# Patient Record
Sex: FEMALE | Race: BLACK OR AFRICAN AMERICAN | NOT HISPANIC OR LATINO | URBAN - METROPOLITAN AREA
[De-identification: names, ages, dates, MRNs, and addresses within clinical notes are randomized per-mention and may not be internally consistent; named-entity substitution may affect disease eponyms.]

---

## 2022-06-28 ENCOUNTER — OFFICE VISIT (OUTPATIENT)
Dept: URBAN - METROPOLITAN AREA CLINIC 44 | Facility: CLINIC | Age: 27
End: 2022-06-28
Payer: COMMERCIAL

## 2022-06-28 ENCOUNTER — LAB OUTSIDE AN ENCOUNTER (OUTPATIENT)
Dept: URBAN - METROPOLITAN AREA CLINIC 44 | Facility: CLINIC | Age: 27
End: 2022-06-28

## 2022-06-28 VITALS
HEIGHT: 61 IN | DIASTOLIC BLOOD PRESSURE: 85 MMHG | TEMPERATURE: 98.1 F | BODY MASS INDEX: 43.58 KG/M2 | SYSTOLIC BLOOD PRESSURE: 144 MMHG | HEART RATE: 76 BPM | WEIGHT: 230.8 LBS

## 2022-06-28 DIAGNOSIS — R10.13 DYSPEPSIA: ICD-10-CM

## 2022-06-28 DIAGNOSIS — R10.9 RIGHT SIDED ABDOMINAL PAIN: ICD-10-CM

## 2022-06-28 DIAGNOSIS — R19.7 WATERY DIARRHEA: ICD-10-CM

## 2022-06-28 PROCEDURE — 99204 OFFICE O/P NEW MOD 45 MIN: CPT | Performed by: INTERNAL MEDICINE

## 2022-06-28 RX ORDER — IBUPROFEN 600 MG/1
1 TABLET WITH FOOD OR MILK AS NEEDED TABLET, FILM COATED ORAL THREE TIMES A DAY
Status: ACTIVE | COMMUNITY

## 2022-06-28 RX ORDER — NAPROXEN 375 MG/1
1 TABLET WITH FOOD OR MILK AS NEEDED TABLET ORAL
Status: ACTIVE | COMMUNITY

## 2022-06-28 RX ORDER — DICYCLOMINE HYDROCHLORIDE 20 MG/1
1 TABLET TABLET ORAL THREE TIMES A DAY
Status: ACTIVE | COMMUNITY

## 2022-06-28 RX ORDER — HYDROCODONE BITARTRATE AND ACETAMINOPHEN 5; 325 MG/1; MG/1
1 TABLET AS NEEDED TABLET ORAL
Status: ACTIVE | COMMUNITY

## 2022-06-28 RX ORDER — CEPHALEXIN 500 MG/1
1 CAPSULE CAPSULE ORAL
Status: ACTIVE | COMMUNITY

## 2022-06-28 RX ORDER — METOPROLOL TARTRATE 100 MG/1
1 TABLET WITH FOOD TABLET, FILM COATED ORAL TWICE A DAY
Status: ACTIVE | COMMUNITY

## 2022-06-28 RX ORDER — ONDANSETRON 4 MG/1
1 TABLET ON THE TONGUE AND ALLOW TO DISSOLVE TABLET, ORALLY DISINTEGRATING ORAL ONCE A DAY
Status: ACTIVE | COMMUNITY

## 2022-06-28 NOTE — HPI-TODAY'S VISIT:
26 yo F presents for an OV to discuss dyspepsia, lower abdominal pain and diarrhea.   1. Dyspepsia. Onset began 1 month ago as she mentions nausea, early satiety and bloating. Denies change in diet and no new medications. However, admits to NSAID use fo period cramps.  2. Lower abdominal pain. Onset 1 month ago. Describes as sharp and rates a 10/10 at its worse. Describes as starting in the LLQ and radiation to the RLQ and R flank. No precipitating factors, no alleviating factors.  3. Diarrhea. Watery and [place] 6 x several per day. Denies hematochezia, out of the country travel, or sick contacts(no hospital, nursing home visits). Abdominal pain not related to bowel habits.   Prior to one month ago the patient had no GI symptoms. Her symptoms prompted two ER visits this month. I reviewed the pelvic US, Xray and CT scan of the abdomen which were all unrevealing. The CT scan revealed a possible distal descending epiploic appendagitis. Denies weight loss.

## 2022-07-01 ENCOUNTER — OFFICE VISIT (OUTPATIENT)
Dept: URBAN - METROPOLITAN AREA SURGERY CENTER 27 | Facility: SURGERY CENTER | Age: 27
End: 2022-07-01
Payer: COMMERCIAL

## 2022-07-01 ENCOUNTER — TELEPHONE ENCOUNTER (OUTPATIENT)
Dept: URBAN - METROPOLITAN AREA CLINIC 92 | Facility: CLINIC | Age: 27
End: 2022-07-01

## 2022-07-01 ENCOUNTER — LAB OUTSIDE AN ENCOUNTER (OUTPATIENT)
Dept: URBAN - METROPOLITAN AREA CLINIC 92 | Facility: CLINIC | Age: 27
End: 2022-07-01

## 2022-07-01 ENCOUNTER — CLAIMS CREATED FROM THE CLAIM WINDOW (OUTPATIENT)
Dept: URBAN - METROPOLITAN AREA CLINIC 4 | Facility: CLINIC | Age: 27
End: 2022-07-01
Payer: COMMERCIAL

## 2022-07-01 DIAGNOSIS — R10.13 ABDOMINAL DISCOMFORT, EPIGASTRIC: ICD-10-CM

## 2022-07-01 DIAGNOSIS — K21.00 ALKALINE REFLUX ESOPHAGITIS: ICD-10-CM

## 2022-07-01 DIAGNOSIS — K31.89 OTHER DISEASES OF STOMACH AND DUODENUM: ICD-10-CM

## 2022-07-01 DIAGNOSIS — K31.89 ACQUIRED DEFORMITY OF DUODENUM: ICD-10-CM

## 2022-07-01 DIAGNOSIS — R10.84 ABDOMINAL CRAMPING, GENERALIZED: ICD-10-CM

## 2022-07-01 PROCEDURE — G8907 PT DOC NO EVENTS ON DISCHARG: HCPCS | Performed by: INTERNAL MEDICINE

## 2022-07-01 PROCEDURE — 88305 TISSUE EXAM BY PATHOLOGIST: CPT | Performed by: PATHOLOGY

## 2022-07-01 PROCEDURE — 88312 SPECIAL STAINS GROUP 1: CPT | Performed by: PATHOLOGY

## 2022-07-01 PROCEDURE — 43239 EGD BIOPSY SINGLE/MULTIPLE: CPT | Performed by: INTERNAL MEDICINE

## 2022-07-01 RX ORDER — HYDROCODONE BITARTRATE AND ACETAMINOPHEN 5; 325 MG/1; MG/1
1 TABLET AS NEEDED TABLET ORAL
Status: ACTIVE | COMMUNITY

## 2022-07-01 RX ORDER — METOPROLOL TARTRATE 100 MG/1
1 TABLET WITH FOOD TABLET, FILM COATED ORAL TWICE A DAY
Status: ACTIVE | COMMUNITY

## 2022-07-01 RX ORDER — DICYCLOMINE HYDROCHLORIDE 20 MG/1
1 TABLET TABLET ORAL THREE TIMES A DAY
Status: ACTIVE | COMMUNITY

## 2022-07-01 RX ORDER — IBUPROFEN 600 MG/1
1 TABLET WITH FOOD OR MILK AS NEEDED TABLET, FILM COATED ORAL THREE TIMES A DAY
Status: ACTIVE | COMMUNITY

## 2022-07-01 RX ORDER — CEPHALEXIN 500 MG/1
1 CAPSULE CAPSULE ORAL
Status: ACTIVE | COMMUNITY

## 2022-07-01 RX ORDER — ONDANSETRON 4 MG/1
1 TABLET ON THE TONGUE AND ALLOW TO DISSOLVE TABLET, ORALLY DISINTEGRATING ORAL ONCE A DAY
Status: ACTIVE | COMMUNITY

## 2022-07-01 RX ORDER — NAPROXEN 375 MG/1
1 TABLET WITH FOOD OR MILK AS NEEDED TABLET ORAL
Status: ACTIVE | COMMUNITY

## 2022-07-01 RX ORDER — PANTOPRAZOLE SODIUM 40 MG/1
1 TABLET TABLET, DELAYED RELEASE ORAL ONCE A DAY
Qty: 30 | OUTPATIENT
Start: 2022-07-01

## 2022-07-05 ENCOUNTER — TELEPHONE ENCOUNTER (OUTPATIENT)
Dept: URBAN - METROPOLITAN AREA CLINIC 46 | Facility: CLINIC | Age: 27
End: 2022-07-05

## 2022-07-05 RX ORDER — IBUPROFEN 600 MG/1
1 TABLET WITH FOOD OR MILK AS NEEDED TABLET, FILM COATED ORAL THREE TIMES A DAY
Status: ACTIVE | COMMUNITY

## 2022-07-05 RX ORDER — CEPHALEXIN 500 MG/1
1 CAPSULE CAPSULE ORAL
Status: ACTIVE | COMMUNITY

## 2022-07-05 RX ORDER — DICYCLOMINE HYDROCHLORIDE 20 MG/1
1 TABLET TABLET ORAL THREE TIMES A DAY
Status: ACTIVE | COMMUNITY

## 2022-07-05 RX ORDER — VANCOMYCIN HYDROCHLORIDE 125 MG/1
1 CAPSULE CAPSULE ORAL
Qty: 40 CAPSULE | Refills: 0 | OUTPATIENT
Start: 2022-07-05 | End: 2022-07-15

## 2022-07-05 RX ORDER — ONDANSETRON 4 MG/1
1 TABLET ON THE TONGUE AND ALLOW TO DISSOLVE TABLET, ORALLY DISINTEGRATING ORAL ONCE A DAY
Status: ACTIVE | COMMUNITY

## 2022-07-05 RX ORDER — NAPROXEN 375 MG/1
1 TABLET WITH FOOD OR MILK AS NEEDED TABLET ORAL
Status: ACTIVE | COMMUNITY

## 2022-07-05 RX ORDER — METOPROLOL TARTRATE 100 MG/1
1 TABLET WITH FOOD TABLET, FILM COATED ORAL TWICE A DAY
Status: ACTIVE | COMMUNITY

## 2022-07-05 RX ORDER — PANTOPRAZOLE SODIUM 40 MG/1
1 TABLET TABLET, DELAYED RELEASE ORAL ONCE A DAY
Qty: 30 | Status: ACTIVE | COMMUNITY
Start: 2022-07-01

## 2022-07-05 RX ORDER — HYDROCODONE BITARTRATE AND ACETAMINOPHEN 5; 325 MG/1; MG/1
1 TABLET AS NEEDED TABLET ORAL
Status: ACTIVE | COMMUNITY

## 2022-07-07 ENCOUNTER — TELEPHONE ENCOUNTER (OUTPATIENT)
Dept: URBAN - METROPOLITAN AREA CLINIC 46 | Facility: CLINIC | Age: 27
End: 2022-07-07

## 2022-07-07 RX ORDER — ONDANSETRON 4 MG/1
1 TABLET ON THE TONGUE AND ALLOW TO DISSOLVE TABLET, ORALLY DISINTEGRATING ORAL ONCE A DAY
Status: ACTIVE | COMMUNITY

## 2022-07-07 RX ORDER — VANCOMYCIN HYDROCHLORIDE 125 MG/1
1 CAPSULE CAPSULE ORAL
Qty: 40 CAPSULE | Refills: 0 | Status: ACTIVE | COMMUNITY
Start: 2022-07-05 | End: 2022-07-15

## 2022-07-07 RX ORDER — HYDROCODONE BITARTRATE AND ACETAMINOPHEN 5; 325 MG/1; MG/1
1 TABLET AS NEEDED TABLET ORAL
Status: ACTIVE | COMMUNITY

## 2022-07-07 RX ORDER — METRONIDAZOLE 500 MG/1
1 TABLET TABLET ORAL THREE TIMES A DAY
Qty: 30 | OUTPATIENT
Start: 2022-07-18 | End: 2022-07-28

## 2022-07-07 RX ORDER — NAPROXEN 375 MG/1
1 TABLET WITH FOOD OR MILK AS NEEDED TABLET ORAL
Status: ACTIVE | COMMUNITY

## 2022-07-07 RX ORDER — CEPHALEXIN 500 MG/1
1 CAPSULE CAPSULE ORAL
Status: ACTIVE | COMMUNITY

## 2022-07-07 RX ORDER — PANTOPRAZOLE SODIUM 40 MG/1
1 TABLET TABLET, DELAYED RELEASE ORAL ONCE A DAY
Qty: 30 | Status: ACTIVE | COMMUNITY
Start: 2022-07-01

## 2022-07-07 RX ORDER — METOPROLOL TARTRATE 100 MG/1
1 TABLET WITH FOOD TABLET, FILM COATED ORAL TWICE A DAY
Status: ACTIVE | COMMUNITY

## 2022-07-07 RX ORDER — DICYCLOMINE HYDROCHLORIDE 20 MG/1
1 TABLET TABLET ORAL THREE TIMES A DAY
Status: ACTIVE | COMMUNITY

## 2022-07-07 RX ORDER — IBUPROFEN 600 MG/1
1 TABLET WITH FOOD OR MILK AS NEEDED TABLET, FILM COATED ORAL THREE TIMES A DAY
Status: ACTIVE | COMMUNITY

## 2022-07-13 PROBLEM — 5933001 CLOSTRIDIUM DIFFICILE: Status: ACTIVE | Noted: 2022-07-13

## 2022-08-02 ENCOUNTER — LAB OUTSIDE AN ENCOUNTER (OUTPATIENT)
Dept: URBAN - METROPOLITAN AREA CLINIC 44 | Facility: CLINIC | Age: 27
End: 2022-08-02

## 2022-08-02 ENCOUNTER — OFFICE VISIT (OUTPATIENT)
Dept: URBAN - METROPOLITAN AREA CLINIC 44 | Facility: CLINIC | Age: 27
End: 2022-08-02
Payer: COMMERCIAL

## 2022-08-02 VITALS
DIASTOLIC BLOOD PRESSURE: 82 MMHG | BODY MASS INDEX: 43.01 KG/M2 | WEIGHT: 227.8 LBS | HEART RATE: 71 BPM | TEMPERATURE: 98.2 F | HEIGHT: 61 IN | SYSTOLIC BLOOD PRESSURE: 129 MMHG

## 2022-08-02 DIAGNOSIS — R10.13 DYSPEPSIA: ICD-10-CM

## 2022-08-02 DIAGNOSIS — A04.72 C. DIFFICILE DIARRHEA: ICD-10-CM

## 2022-08-02 PROCEDURE — 99214 OFFICE O/P EST MOD 30 MIN: CPT | Performed by: INTERNAL MEDICINE

## 2022-08-02 RX ORDER — ETONOGESTREL 68 MG/1
AS DIRECTED IMPLANT SUBCUTANEOUS
Status: ACTIVE | COMMUNITY

## 2022-08-02 RX ORDER — METOPROLOL TARTRATE 100 MG/1
1 TABLET WITH FOOD TABLET, FILM COATED ORAL TWICE A DAY
Status: ACTIVE | COMMUNITY

## 2022-08-02 RX ORDER — PROMETHAZINE HYDROCHLORIDE 25 MG/1
1 TABLET AS NEEDED TABLET ORAL
Qty: 60 | OUTPATIENT
Start: 2022-08-02 | End: 2022-09-01

## 2022-08-02 NOTE — HPI-TODAY'S VISIT:
28 yo F presents for an OV to discuss dyspepsia, lower abdominal pain and diarrhea.   1. Dyspepsia.  Onset began a few months ago and she mentions nausea, early satiety and bloating. Denies change in diet and no new medications. However, admits to NSAID use fo period cramps. However, after her last EGD on 7/22(grade d esophagitis) she discontinued thes medications. Her stomach feels minimally better. She still endorses nausea and intermittent cramping.  2. Lower abdominal pain. Moderately resolved. Intermittent RLQ pain.    3. Diarrhea. Improved but she now has 2 BMs that are toothpaste consistency(previously watery and [place] 6 x several per day). Denies hematochezia, out of the country travel, or sick contacts(no hospital, nursing home visits). Abdominal pain not related to bowel habits.   Brief history: Prior to one month ago the patient had no GI symptoms. Her symptoms prompted two ER visits this month. I reviewed the pelvic US, Xray and CT scan of the abdomen which were all unrevealing. The CT scan revealed a possible distal descending epiploic appendagitis. Denies weight loss.

## 2022-08-19 ENCOUNTER — OFFICE VISIT (OUTPATIENT)
Dept: URBAN - METROPOLITAN AREA SURGERY CENTER 27 | Facility: SURGERY CENTER | Age: 27
End: 2022-08-19
Payer: COMMERCIAL

## 2022-08-19 ENCOUNTER — TELEPHONE ENCOUNTER (OUTPATIENT)
Dept: URBAN - METROPOLITAN AREA CLINIC 44 | Facility: CLINIC | Age: 27
End: 2022-08-19

## 2022-08-19 ENCOUNTER — CLAIMS CREATED FROM THE CLAIM WINDOW (OUTPATIENT)
Dept: URBAN - METROPOLITAN AREA CLINIC 4 | Facility: CLINIC | Age: 27
End: 2022-08-19
Payer: COMMERCIAL

## 2022-08-19 DIAGNOSIS — R10.13 ABDOMINAL DISCOMFORT, EPIGASTRIC: ICD-10-CM

## 2022-08-19 DIAGNOSIS — K29.70 GASTRITIS, UNSPECIFIED, WITHOUT BLEEDING: ICD-10-CM

## 2022-08-19 DIAGNOSIS — K44.9 DIAPHRAGMATIC HERNIA: ICD-10-CM

## 2022-08-19 DIAGNOSIS — K29.70 CHRONIC ACITVE GASTRITIS (H.PYLORI NEGATIVE): ICD-10-CM

## 2022-08-19 PROCEDURE — 88312 SPECIAL STAINS GROUP 1: CPT | Performed by: PATHOLOGY

## 2022-08-19 PROCEDURE — 88305 TISSUE EXAM BY PATHOLOGIST: CPT | Performed by: PATHOLOGY

## 2022-08-19 PROCEDURE — 43239 EGD BIOPSY SINGLE/MULTIPLE: CPT | Performed by: INTERNAL MEDICINE

## 2022-08-19 PROCEDURE — G8907 PT DOC NO EVENTS ON DISCHARG: HCPCS | Performed by: INTERNAL MEDICINE

## 2022-08-19 RX ORDER — ETONOGESTREL 68 MG/1
AS DIRECTED IMPLANT SUBCUTANEOUS
Status: ACTIVE | COMMUNITY

## 2022-08-19 RX ORDER — PROMETHAZINE HYDROCHLORIDE 25 MG/1
1 TABLET AS NEEDED TABLET ORAL
Qty: 60 | Status: ACTIVE | COMMUNITY
Start: 2022-08-02 | End: 2022-09-01

## 2022-08-19 RX ORDER — METOPROLOL TARTRATE 100 MG/1
1 TABLET WITH FOOD TABLET, FILM COATED ORAL TWICE A DAY
Status: ACTIVE | COMMUNITY

## 2022-08-19 RX ORDER — PANTOPRAZOLE SODIUM 40 MG/1
1 TABLET TABLET, DELAYED RELEASE ORAL ONCE A DAY
Qty: 60 TABLET | Refills: 2 | OUTPATIENT
Start: 2022-08-19

## 2023-02-23 ENCOUNTER — WEB ENCOUNTER (OUTPATIENT)
Dept: URBAN - METROPOLITAN AREA CLINIC 44 | Facility: CLINIC | Age: 28
End: 2023-02-23

## 2023-02-23 RX ORDER — PANTOPRAZOLE SODIUM 40 MG/1
1 TABLET TABLET, DELAYED RELEASE ORAL ONCE A DAY
Qty: 60 TABLET | Refills: 2
Start: 2022-08-19

## 2023-02-28 ENCOUNTER — ERX REFILL RESPONSE (OUTPATIENT)
Dept: URBAN - METROPOLITAN AREA CLINIC 44 | Facility: CLINIC | Age: 28
End: 2023-02-28

## 2023-02-28 RX ORDER — PANTOPRAZOLE SODIUM 40 MG/1
1 TABLET TABLET, DELAYED RELEASE ORAL ONCE A DAY
Qty: 60 TABLET | Refills: 2 | OUTPATIENT

## 2023-02-28 RX ORDER — PANTOPRAZOLE SODIUM 40 MG/1
TAKE 1 TABLET BY MOUTH EVERY DAY FOR 60 DAYS TABLET, DELAYED RELEASE ORAL
Qty: 30 TABLET | Refills: 5 | OUTPATIENT

## 2023-08-18 ENCOUNTER — WEB ENCOUNTER (OUTPATIENT)
Dept: URBAN - METROPOLITAN AREA CLINIC 44 | Facility: CLINIC | Age: 28
End: 2023-08-18

## 2023-08-18 RX ORDER — PANTOPRAZOLE SODIUM 40 MG/1
1 TABLET TABLET, DELAYED RELEASE ORAL ONCE A DAY
Qty: 90 TABLET | Refills: 3

## 2023-10-30 ENCOUNTER — OFFICE VISIT (OUTPATIENT)
Dept: URBAN - METROPOLITAN AREA CLINIC 48 | Facility: CLINIC | Age: 28
End: 2023-10-30
Payer: COMMERCIAL

## 2023-10-30 ENCOUNTER — TELEPHONE ENCOUNTER (OUTPATIENT)
Dept: URBAN - METROPOLITAN AREA CLINIC 48 | Facility: CLINIC | Age: 28
End: 2023-10-30

## 2023-10-30 VITALS
DIASTOLIC BLOOD PRESSURE: 73 MMHG | HEIGHT: 61 IN | BODY MASS INDEX: 41.31 KG/M2 | WEIGHT: 218.8 LBS | TEMPERATURE: 98.1 F | HEART RATE: 71 BPM | SYSTOLIC BLOOD PRESSURE: 116 MMHG

## 2023-10-30 DIAGNOSIS — R10.13 DYSPEPSIA: ICD-10-CM

## 2023-10-30 DIAGNOSIS — R19.7 WATERY DIARRHEA: ICD-10-CM

## 2023-10-30 PROCEDURE — 99214 OFFICE O/P EST MOD 30 MIN: CPT | Performed by: INTERNAL MEDICINE

## 2023-10-30 RX ORDER — ETONOGESTREL 68 MG/1
AS DIRECTED IMPLANT SUBCUTANEOUS
Status: ACTIVE | COMMUNITY

## 2023-10-30 RX ORDER — PANTOPRAZOLE SODIUM 40 MG/1
1 TABLET TABLET, DELAYED RELEASE ORAL ONCE A DAY
Qty: 90 TABLET | Refills: 3 | Status: ACTIVE | COMMUNITY

## 2023-10-30 RX ORDER — METOPROLOL TARTRATE 100 MG/1
1 TABLET WITH FOOD TABLET, FILM COATED ORAL TWICE A DAY
Status: ACTIVE | COMMUNITY

## 2023-10-30 RX ORDER — ELUXADOLINE 75 MG/1
1 TABLET WITH FOOD TABLET, FILM COATED ORAL TWICE A DAY
Qty: 60 TABLET | Refills: 0 | OUTPATIENT
Start: 2023-10-30 | End: 2023-11-28

## 2023-10-30 NOTE — HPI-TODAY'S VISIT:
27 yo F presents for an OV to discuss dyspepsia, GERD and diarrhea.   1. Dyspepsia.  Onset began a few months ago and she mentions nausea, early satiety and bloating. Denies change in diet and no new medications. However, admits to NSAID use fo period cramps. However, after her last EGD on 7/22(grade d esophagitis) she discontinued thes medications. Her stomach feels minimally better. We repeated an EGD 8 weeks later after a trial of daily PPI and this resolved her symptoms.   2. Diarrhea. Improved but she now has 2 BMs that are toothpaste consistency(previously watery and [place] 6 x several per day). Denies hematochezia, out of the country travel, or sick contacts(no hospital, nursing home visits).  Brief history: Prior to one month ago the patient had no GI symptoms. Her symptoms prompted two ER visits this month. I reviewed the pelvic US, Xray and CT scan of the abdomen which were all unrevealing. The CT scan revealed a possible distal descending epiploic appendagitis. Denies weight loss.

## 2023-10-31 ENCOUNTER — TELEPHONE ENCOUNTER (OUTPATIENT)
Dept: URBAN - METROPOLITAN AREA CLINIC 46 | Facility: CLINIC | Age: 28
End: 2023-10-31

## 2023-10-31 RX ORDER — FAMOTIDINE 40 MG/1
1 TABLET AT BEDTIME TABLET, FILM COATED ORAL ONCE A DAY
Qty: 30 | OUTPATIENT
Start: 2023-10-31

## 2023-11-30 ENCOUNTER — ERX REFILL RESPONSE (OUTPATIENT)
Dept: URBAN - METROPOLITAN AREA CLINIC 46 | Facility: CLINIC | Age: 28
End: 2023-11-30

## 2023-11-30 RX ORDER — FAMOTIDINE 40 MG/1
1 TABLET AT BEDTIME TABLET, FILM COATED ORAL ONCE A DAY
Qty: 30 | OUTPATIENT

## 2023-11-30 RX ORDER — FAMOTIDINE 40 MG/1
TAKE 1 TABLET BY MOUTH EVERY DAY AT BEDTIME FOR 30 DAYS TABLET, FILM COATED ORAL
Qty: 90 TABLET | Refills: 1 | OUTPATIENT

## 2024-04-09 ENCOUNTER — LAB (OUTPATIENT)
Dept: URBAN - METROPOLITAN AREA CLINIC 48 | Facility: CLINIC | Age: 29
End: 2024-04-09

## 2024-04-09 ENCOUNTER — OV EP (OUTPATIENT)
Dept: URBAN - METROPOLITAN AREA CLINIC 48 | Facility: CLINIC | Age: 29
End: 2024-04-09
Payer: COMMERCIAL

## 2024-04-09 VITALS
BODY MASS INDEX: 39.5 KG/M2 | DIASTOLIC BLOOD PRESSURE: 76 MMHG | SYSTOLIC BLOOD PRESSURE: 126 MMHG | HEIGHT: 61 IN | TEMPERATURE: 97.7 F | HEART RATE: 65 BPM | WEIGHT: 209.2 LBS

## 2024-04-09 DIAGNOSIS — R10.13 DYSPEPSIA: ICD-10-CM

## 2024-04-09 DIAGNOSIS — R19.7 WATERY DIARRHEA: ICD-10-CM

## 2024-04-09 DIAGNOSIS — K44.9 HIATAL HERNIA: ICD-10-CM

## 2024-04-09 DIAGNOSIS — K20.90 ESOPHAGITIS: ICD-10-CM

## 2024-04-09 PROBLEM — 84089009: Status: ACTIVE | Noted: 2024-04-09

## 2024-04-09 PROBLEM — 16761005: Status: ACTIVE | Noted: 2024-04-09

## 2024-04-09 PROBLEM — 79922009: Status: ACTIVE | Noted: 2024-04-09

## 2024-04-09 PROCEDURE — 99213 OFFICE O/P EST LOW 20 MIN: CPT | Performed by: INTERNAL MEDICINE

## 2024-04-09 RX ORDER — METOPROLOL TARTRATE 100 MG/1
1 TABLET WITH FOOD TABLET, FILM COATED ORAL TWICE A DAY
Status: ACTIVE | COMMUNITY

## 2024-04-09 RX ORDER — FAMOTIDINE 40 MG/1
TAKE 1 TABLET BY MOUTH EVERY DAY AT BEDTIME FOR 30 DAYS TABLET, FILM COATED ORAL
Qty: 90 TABLET | Refills: 1 | Status: ACTIVE | COMMUNITY

## 2024-04-09 RX ORDER — ETONOGESTREL 68 MG/1
AS DIRECTED IMPLANT SUBCUTANEOUS
Status: ACTIVE | COMMUNITY

## 2024-04-09 RX ORDER — GABAPENTIN 300 MG/1
1 CAPSULE CAPSULE ORAL ONCE A DAY
Qty: 30 CAPSULE | Status: ON HOLD | COMMUNITY

## 2024-04-09 RX ORDER — PANTOPRAZOLE SODIUM 20 MG/1
1 TABLET TABLET, DELAYED RELEASE ORAL ONCE A DAY
Qty: 30 | Refills: 3 | OUTPATIENT
Start: 2024-04-09

## 2024-04-09 RX ORDER — PANTOPRAZOLE SODIUM 40 MG/1
1 TABLET TABLET, DELAYED RELEASE ORAL ONCE A DAY
Qty: 90 TABLET | Refills: 3 | Status: ACTIVE | COMMUNITY

## 2024-04-09 RX ORDER — LORATADINE 10 MG/1
1 TABLET TABLET ORAL ONCE A DAY
Qty: 30 TABLET | Status: ACTIVE | COMMUNITY

## 2024-04-09 RX ORDER — TOPIRAMATE 50 MG/1
1 TABLET TABLET, FILM COATED ORAL ONCE A DAY
Qty: 30 TABLET | Status: ACTIVE | COMMUNITY

## 2024-04-09 NOTE — HPI-TODAY'S VISIT:
28 yo F presents for follow up of dyspepsia, GERD and diarrhea.   1. Dyspepsia.  Onset began a few months ago and she mentions nausea, early satiety and bloating. Denies change in diet and no new medications. However, admits to NSAID use fo period cramps. However, after her last EGD on 7/22(grade d esophagitis and esophageal ulcers) she discontinued the medications. Repeat EGD 8 weeks later after a trial of daily PPI which showed improvement of esophagitis and healed ulcers. Symptoms have improved and she denies GERD or dypsepsia. Currenlty, she is still taking Pantoprazole 40mg daily.   2. Diarrhea. Viberzi was prescribed, but she has not taken it. Bowel movements are 2-4 times a day of soft stool but she states this is her baseline. Denies blood in stool.   3. Epigastric discomfort- usually only with certain foods including fried foods and sweets or sweet drinks. She will also have diarrhea if she eats those things.   Brief history: Prior to iniitial visit, she did not have GI symptoms.  Her symptoms prompted two ER visits this month. I reviewed the pelvic US, Xray and CT scan of the abdomen which were all unrevealing. The CT scan revealed a possible distal descending epiploic appendagitis. Denies weight loss.

## 2024-05-10 ENCOUNTER — TELEPHONE ENCOUNTER (OUTPATIENT)
Dept: URBAN - METROPOLITAN AREA CLINIC 48 | Facility: CLINIC | Age: 29
End: 2024-05-10

## 2024-05-14 ENCOUNTER — TELEPHONE ENCOUNTER (OUTPATIENT)
Dept: URBAN - METROPOLITAN AREA CLINIC 46 | Facility: CLINIC | Age: 29
End: 2024-05-14

## 2024-05-16 ENCOUNTER — TELEPHONE ENCOUNTER (OUTPATIENT)
Dept: URBAN - METROPOLITAN AREA CLINIC 48 | Facility: CLINIC | Age: 29
End: 2024-05-16

## 2024-09-11 ENCOUNTER — WEB ENCOUNTER (OUTPATIENT)
Dept: URBAN - METROPOLITAN AREA CLINIC 44 | Facility: CLINIC | Age: 29
End: 2024-09-11

## 2024-09-11 ENCOUNTER — ERX REFILL RESPONSE (OUTPATIENT)
Dept: URBAN - METROPOLITAN AREA CLINIC 44 | Facility: CLINIC | Age: 29
End: 2024-09-11

## 2024-09-11 RX ORDER — PANTOPRAZOLE SODIUM 40 MG/1
TAKE 1 TABLET BY MOUTH EVERY DAY FOR 90 DAYS TABLET, DELAYED RELEASE ORAL
Qty: 30 TABLET | Refills: 11 | OUTPATIENT

## 2024-09-11 RX ORDER — PANTOPRAZOLE SODIUM 20 MG/1
1 TABLET TABLET, DELAYED RELEASE ORAL ONCE A DAY
Qty: 30 | Refills: 3
Start: 2024-04-09

## 2024-09-11 RX ORDER — PANTOPRAZOLE SODIUM 40 MG/1
1 TABLET TABLET, DELAYED RELEASE ORAL ONCE A DAY
Qty: 90 TABLET | Refills: 3 | OUTPATIENT

## 2024-09-13 ENCOUNTER — TELEPHONE ENCOUNTER (OUTPATIENT)
Dept: URBAN - METROPOLITAN AREA CLINIC 48 | Facility: CLINIC | Age: 29
End: 2024-09-13

## 2024-09-17 ENCOUNTER — TELEPHONE ENCOUNTER (OUTPATIENT)
Dept: URBAN - METROPOLITAN AREA CLINIC 46 | Facility: CLINIC | Age: 29
End: 2024-09-17

## 2024-09-17 RX ORDER — PANTOPRAZOLE SODIUM 20 MG/1
1 TABLET TABLET, DELAYED RELEASE ORAL ONCE A DAY
OUTPATIENT
Start: 2024-04-09

## 2024-09-17 RX ORDER — PANTOPRAZOLE SODIUM 40 MG/1
TAKE 1 TABLET BY MOUTH EVERY DAY FOR 90 DAYS TABLET, DELAYED RELEASE ORAL
Qty: 30 TABLET | Refills: 5

## 2024-10-09 ENCOUNTER — DASHBOARD ENCOUNTERS (OUTPATIENT)
Age: 29
End: 2024-10-09

## 2024-10-09 ENCOUNTER — OFFICE VISIT (OUTPATIENT)
Dept: URBAN - METROPOLITAN AREA CLINIC 48 | Facility: CLINIC | Age: 29
End: 2024-10-09
Payer: COMMERCIAL

## 2024-10-09 VITALS
DIASTOLIC BLOOD PRESSURE: 76 MMHG | HEART RATE: 70 BPM | WEIGHT: 208.6 LBS | SYSTOLIC BLOOD PRESSURE: 114 MMHG | TEMPERATURE: 98.2 F | HEIGHT: 61 IN | BODY MASS INDEX: 39.38 KG/M2

## 2024-10-09 DIAGNOSIS — K21.9 GASTROESOPHAGEAL REFLUX DISEASE WITHOUT ESOPHAGITIS: ICD-10-CM

## 2024-10-09 DIAGNOSIS — R10.13 DYSPEPSIA: ICD-10-CM

## 2024-10-09 DIAGNOSIS — R19.7 WATERY DIARRHEA: ICD-10-CM

## 2024-10-09 PROBLEM — 266435005: Status: ACTIVE | Noted: 2024-10-09

## 2024-10-09 PROCEDURE — 99213 OFFICE O/P EST LOW 20 MIN: CPT | Performed by: NURSE PRACTITIONER

## 2024-10-09 RX ORDER — LORATADINE 10 MG/1
1 TABLET TABLET ORAL ONCE A DAY
Qty: 30 TABLET | Status: ACTIVE | COMMUNITY

## 2024-10-09 RX ORDER — METOPROLOL TARTRATE 100 MG/1
1 TABLET WITH FOOD TABLET, FILM COATED ORAL TWICE A DAY
Status: ACTIVE | COMMUNITY

## 2024-10-09 RX ORDER — ETONOGESTREL 68 MG/1
AS DIRECTED IMPLANT SUBCUTANEOUS
Status: ACTIVE | COMMUNITY

## 2024-10-09 RX ORDER — TOPIRAMATE 50 MG/1
1 TABLET TABLET, FILM COATED ORAL ONCE A DAY
Qty: 30 TABLET | Status: ACTIVE | COMMUNITY

## 2024-10-09 RX ORDER — PANTOPRAZOLE SODIUM 40 MG/1
1 TABLET TABLET, DELAYED RELEASE ORAL ONCE A DAY
Qty: 30 TABLET | Refills: 5 | Status: ACTIVE | COMMUNITY

## 2024-10-09 NOTE — HPI-TODAY'S VISIT:
28 yo F presents for follow up of dyspepsia, GERD and diarrhea.   1. Dyspepsia.  Onset began a few months ago and she mentions nausea, early satiety and bloating. Denies change in diet and no new medications. However, admits to NSAID use for period cramps.  EGD on 7/2022 showed grade D esophagitis and esophageal ulcers.Repeat EGD 8 weeks later after a trial of daily PPI which showed improvement of esophagitis and healed ulcers. Symptoms have improved and she denies GERD or dypsepsia. Currenlty, she is still taking Pantoprazole 40mg daily but was on 20mg but it did not work.  2. Diarrhea. Viberzi was prescribed, but she has not taken it. Bowel movements are 5 times a day of soft stool but she states this is her baseline. Denies blood in stool.  Celiac panel was ordered but not done. Last week, she had 3 days of urgent diarrhea multiple times a day but it resovled.   3. Epigastric discomfort- usually only with certain foods including fried foods and sweets or sweet drinks. She will also have diarrhea if she eats those things. HIDA 4/2024 was normal.

## 2024-10-10 LAB
C-REACTIVE PROTEIN, QUANT: <3
IMMUNOGLOBULIN A, QN, SERUM: 284
T-TRANSGLUTAMINASE (TTG) IGA: <1

## 2024-12-04 ENCOUNTER — TELEPHONE ENCOUNTER (OUTPATIENT)
Dept: URBAN - METROPOLITAN AREA CLINIC 48 | Facility: CLINIC | Age: 29
End: 2024-12-04

## 2024-12-04 RX ORDER — PANTOPRAZOLE SODIUM 40 MG/1
1 TABLET TABLET, DELAYED RELEASE ORAL ONCE A DAY
Qty: 30 TABLET | Refills: 5

## 2024-12-06 ENCOUNTER — TELEPHONE ENCOUNTER (OUTPATIENT)
Dept: URBAN - METROPOLITAN AREA CLINIC 48 | Facility: CLINIC | Age: 29
End: 2024-12-06

## 2024-12-10 ENCOUNTER — TELEPHONE ENCOUNTER (OUTPATIENT)
Dept: URBAN - METROPOLITAN AREA CLINIC 48 | Facility: CLINIC | Age: 29
End: 2024-12-10

## 2025-01-09 ENCOUNTER — OFFICE VISIT (OUTPATIENT)
Dept: URBAN - METROPOLITAN AREA CLINIC 48 | Facility: CLINIC | Age: 30
End: 2025-01-09

## 2025-01-09 RX ORDER — LORATADINE 10 MG/1
1 TABLET TABLET ORAL ONCE A DAY
Qty: 30 TABLET | Status: ACTIVE | COMMUNITY

## 2025-01-09 RX ORDER — TOPIRAMATE 50 MG/1
1 TABLET TABLET, FILM COATED ORAL ONCE A DAY
Qty: 30 TABLET | Status: ACTIVE | COMMUNITY

## 2025-01-09 RX ORDER — PANTOPRAZOLE SODIUM 40 MG/1
1 TABLET TABLET, DELAYED RELEASE ORAL ONCE A DAY
Qty: 30 TABLET | Refills: 5 | Status: ACTIVE | COMMUNITY

## 2025-01-09 RX ORDER — METOPROLOL TARTRATE 100 MG/1
1 TABLET WITH FOOD TABLET, FILM COATED ORAL TWICE A DAY
Status: ACTIVE | COMMUNITY

## 2025-01-09 RX ORDER — ETONOGESTREL 68 MG/1
AS DIRECTED IMPLANT SUBCUTANEOUS
Status: ACTIVE | COMMUNITY

## 2025-01-29 ENCOUNTER — OFFICE VISIT (OUTPATIENT)
Dept: URBAN - METROPOLITAN AREA TELEHEALTH 2 | Facility: TELEHEALTH | Age: 30
End: 2025-01-29
Payer: COMMERCIAL

## 2025-01-29 DIAGNOSIS — R10.30 LOWER ABDOMINAL PAIN: ICD-10-CM

## 2025-01-29 DIAGNOSIS — R10.13 DYSPEPSIA: ICD-10-CM

## 2025-01-29 DIAGNOSIS — R19.4 CHANGE IN BOWEL HABITS: ICD-10-CM

## 2025-01-29 PROBLEM — 88111009: Status: ACTIVE | Noted: 2025-01-29

## 2025-01-29 PROBLEM — 54586004: Status: ACTIVE | Noted: 2025-01-29

## 2025-01-29 PROCEDURE — 99213 OFFICE O/P EST LOW 20 MIN: CPT | Performed by: NURSE PRACTITIONER

## 2025-01-29 RX ORDER — LORATADINE 10 MG/1
1 TABLET TABLET ORAL ONCE A DAY
Qty: 30 TABLET | Status: ACTIVE | COMMUNITY

## 2025-01-29 RX ORDER — HYOSCYAMINE SULFATE 0.12 MG/1
1 TABLET UNDER THE TONGUE AND ALLOW TO DISSOLVE AS NEEDED FOR ABDOMINAL PAIN TABLET SUBLINGUAL THREE TIMES A DAY
Qty: 270 TABLET | Refills: 1 | OUTPATIENT
Start: 2025-01-29 | End: 2025-07-28

## 2025-01-29 RX ORDER — METOPROLOL TARTRATE 100 MG/1
1 TABLET WITH FOOD TABLET, FILM COATED ORAL TWICE A DAY
Status: ACTIVE | COMMUNITY

## 2025-01-29 RX ORDER — ETONOGESTREL 68 MG/1
AS DIRECTED IMPLANT SUBCUTANEOUS
Status: ACTIVE | COMMUNITY

## 2025-01-29 RX ORDER — TOPIRAMATE 50 MG/1
1 TABLET TABLET, FILM COATED ORAL ONCE A DAY
Qty: 30 TABLET | Status: ACTIVE | COMMUNITY

## 2025-01-29 RX ORDER — PANTOPRAZOLE SODIUM 40 MG/1
1 TABLET TABLET, DELAYED RELEASE ORAL ONCE A DAY
Qty: 30 TABLET | Refills: 5 | Status: ACTIVE | COMMUNITY

## 2025-01-29 NOTE — HPI-TODAY'S VISIT:
28 yo F presents for follow up of dyspepsia, GERD and diarrhea.   1. Dyspepsia.  Onset began a few months ago and she mentions nausea, early satiety and bloating. Denies change in diet and no new medications. However, admits to NSAID use for period cramps.  EGD on 7/2022 showed grade D esophagitis and esophageal ulcers.Repeat EGD 8 weeks later after a trial of daily PPI which showed improvement of esophagitis and healed ulcers. VSL probiotics were given. Currently, she is still taking Pantoprazole 40mg.  20mg did not work.  2. Diarrhea. Viberzi was prescribed, but she has not taken it. Bowel movements are 5 times a day of soft stool but she states this is her baseline. Denies blood in stool.  Lower abdominal cramping is intermittent but is resolved after a bowel movement.   3. Epigastric discomfort- usually only with certain foods including fried foods and sweets or sweet drinks. She will also have diarrhea if she eats those things. HIDA 4/2024 was normal. Celiac panel was negative and CRP was normal. Pain continues intermittently.

## 2025-03-31 ENCOUNTER — OFFICE VISIT (OUTPATIENT)
Dept: URBAN - METROPOLITAN AREA CLINIC 44 | Facility: CLINIC | Age: 30
End: 2025-03-31

## 2025-03-31 RX ORDER — HYOSCYAMINE SULFATE 0.12 MG/1
1 TABLET UNDER THE TONGUE AND ALLOW TO DISSOLVE AS NEEDED FOR ABDOMINAL PAIN TABLET SUBLINGUAL THREE TIMES A DAY
Qty: 270 TABLET | Refills: 1 | Status: ACTIVE | COMMUNITY
Start: 2025-01-29 | End: 2025-07-28

## 2025-03-31 RX ORDER — PANTOPRAZOLE SODIUM 40 MG/1
1 TABLET TABLET, DELAYED RELEASE ORAL ONCE A DAY
Qty: 30 TABLET | Refills: 5 | Status: ACTIVE | COMMUNITY

## 2025-03-31 RX ORDER — FAMOTIDINE 40 MG/1
1 TABLET AS NEEDED FOR BREAKTHROUGH REFLUX TABLET, FILM COATED ORAL ONCE A DAY
Qty: 30 TABLET | Refills: 3 | OUTPATIENT
Start: 2025-03-31

## 2025-03-31 RX ORDER — LORATADINE 10 MG/1
1 TABLET TABLET ORAL ONCE A DAY
Qty: 30 TABLET | Status: ACTIVE | COMMUNITY

## 2025-03-31 RX ORDER — ESOMEPRAZOLE MAGNESIUM 40 MG/1
1 CAPSULE 1/2 TO 1 HOUR BEFORE MORNING MEAL CAPSULE, DELAYED RELEASE PELLETS ORAL ONCE A DAY
Qty: 30 | Refills: 6 | OUTPATIENT
Start: 2025-03-31

## 2025-03-31 RX ORDER — TOPIRAMATE 50 MG/1
1 TABLET TABLET, FILM COATED ORAL ONCE A DAY
Qty: 30 TABLET | Status: ACTIVE | COMMUNITY

## 2025-03-31 RX ORDER — METOPROLOL TARTRATE 100 MG/1
1 TABLET WITH FOOD TABLET, FILM COATED ORAL TWICE A DAY
Status: ACTIVE | COMMUNITY

## 2025-03-31 NOTE — HPI-TODAY'S VISIT:
28 yo F presents for follow up of dyspepsia, GERD. The patient has a history of GERD that had been well controlled on Pantoprazole 40mg until recently. She now has chest burning but denies epigastric pain. Dyspepsia has resolved. Denies change in diet and no new medications. However, admits to NSAID use for period cramps.  EGD on 7/2022 showed grade D esophagitis and esophageal ulcers.Repeat EGD 8 weeks later after a trial of daily PPI which showed improvement of esophagitis and healed ulcers. VSL probiotics were given at past visit. Pantoprazole 20mg did not help.   Additionally, she was seen for diarrhea which resolved. Viberzi was given but she did not end up needin t, Diarrhea. Viberzi was prescribed, but she has not taken it. Bowel movements are 5 times a day of soft stool but she states this is her baseline. Denies blood in stool.  Lower abdominal cramping is intermittent but is resolved after a bowel movement.  HIDA 4/2024 was normal. Celiac panel was negative and CRP was normal.

## 2025-04-09 ENCOUNTER — TELEPHONE ENCOUNTER (OUTPATIENT)
Dept: URBAN - METROPOLITAN AREA CLINIC 46 | Facility: CLINIC | Age: 30
End: 2025-04-09

## 2025-04-09 RX ORDER — PANTOPRAZOLE SODIUM 40 MG/1
1 TABLET 1/2 TO 1 HOUR BEFORE MORNING MEAL TABLET, DELAYED RELEASE ORAL ONCE A DAY
Qty: 30 | Refills: 5 | OUTPATIENT
Start: 2025-04-09

## 2025-04-16 ENCOUNTER — OFFICE VISIT (OUTPATIENT)
Dept: URBAN - METROPOLITAN AREA CLINIC 48 | Facility: CLINIC | Age: 30
End: 2025-04-16

## 2025-04-16 ENCOUNTER — OFFICE VISIT (OUTPATIENT)
Dept: URBAN - METROPOLITAN AREA CLINIC 48 | Facility: CLINIC | Age: 30
End: 2025-04-16
Payer: COMMERCIAL

## 2025-04-16 DIAGNOSIS — R11.14 BILIOUS VOMITING WITH NAUSEA: ICD-10-CM

## 2025-04-16 DIAGNOSIS — R19.4 CHANGE IN BOWEL HABITS: ICD-10-CM

## 2025-04-16 DIAGNOSIS — K21.9 GASTROESOPHAGEAL REFLUX DISEASE WITHOUT ESOPHAGITIS: ICD-10-CM

## 2025-04-16 PROBLEM — 71419002: Status: ACTIVE | Noted: 2025-04-16

## 2025-04-16 PROBLEM — 422587007: Status: ACTIVE | Noted: 2025-04-16

## 2025-04-16 PROCEDURE — 99214 OFFICE O/P EST MOD 30 MIN: CPT | Performed by: NURSE PRACTITIONER

## 2025-04-16 RX ORDER — METOPROLOL TARTRATE 100 MG/1
1 TABLET WITH FOOD TABLET, FILM COATED ORAL TWICE A DAY
Status: ACTIVE | COMMUNITY

## 2025-04-16 RX ORDER — PROMETHAZINE HYDROCHLORIDE 25 MG/1
1 TABLET AS NEEDED FOR NAUSEA TABLET ORAL
Qty: 60 TABLET | Refills: 0 | OUTPATIENT
Start: 2025-04-16

## 2025-04-16 RX ORDER — TOPIRAMATE 50 MG/1
1 TABLET TABLET, FILM COATED ORAL ONCE A DAY
Qty: 30 TABLET | Status: ACTIVE | COMMUNITY

## 2025-04-16 RX ORDER — LANSOPRAZOLE 30 MG/1
1 CAPSULE 1/2 TO 1 HOUR BEFORE MORNING MEAL CAPSULE, DELAYED RELEASE ORAL ONCE A DAY
Qty: 30 | OUTPATIENT
Start: 2025-04-16

## 2025-04-16 RX ORDER — CIPROFLOXACIN 500 MG/1
1 TABLET TABLET, FILM COATED ORAL TWICE A DAY
Qty: 20 TABLET | Refills: 0 | OUTPATIENT
Start: 2025-04-16 | End: 2025-04-26

## 2025-04-16 RX ORDER — ESOMEPRAZOLE MAGNESIUM 40 MG/1
1 CAPSULE 1/2 TO 1 HOUR BEFORE MORNING MEAL CAPSULE, DELAYED RELEASE PELLETS ORAL ONCE A DAY
Qty: 30 | Refills: 6 | Status: ON HOLD | COMMUNITY
Start: 2025-03-31

## 2025-04-16 RX ORDER — FAMOTIDINE 40 MG/1
1 TABLET AS NEEDED FOR BREAKTHROUGH REFLUX TABLET, FILM COATED ORAL ONCE A DAY
Qty: 30 TABLET | Refills: 3 | Status: ON HOLD | COMMUNITY
Start: 2025-03-31

## 2025-04-16 RX ORDER — PANTOPRAZOLE SODIUM 40 MG/1
1 TABLET 1/2 TO 1 HOUR BEFORE MORNING MEAL TABLET, DELAYED RELEASE ORAL ONCE A DAY
Qty: 30 | Refills: 5 | Status: ACTIVE | COMMUNITY
Start: 2025-04-09

## 2025-04-16 RX ORDER — HYOSCYAMINE SULFATE 0.12 MG/1
1 TABLET UNDER THE TONGUE AND ALLOW TO DISSOLVE AS NEEDED FOR ABDOMINAL PAIN TABLET SUBLINGUAL THREE TIMES A DAY
Qty: 270 TABLET | Refills: 1 | Status: ACTIVE | COMMUNITY
Start: 2025-01-29 | End: 2025-07-28

## 2025-04-16 RX ORDER — ONDANSETRON 4 MG/1
1 TABLET ON THE TONGUE AND ALLOW TO DISSOLVE TABLET, ORALLY DISINTEGRATING ORAL ONCE A DAY
Qty: 30 | Refills: 1 | OUTPATIENT
Start: 2025-04-16

## 2025-04-16 NOTE — HPI-TODAY'S VISIT:
30 yo F presents for evaluation of diarrhea and vomiting. Symptoms began Monday. Stool is watery and multiple times a day. She has not been able to eat much due to nausea and vomiting. Vomiting has occurred multiple times a day. Denies sick exposure, foreign travel, or new medications.   The patient has a history of GERD that had been well controlled on Pantoprazole 40mg until recently.Esomeprazole was oredered but not covered by insurance. She now has chest burning but denies epigastric pain. Dyspepsia has resolved.  EGD on 7/2022 showed grade D esophagitis and esophageal ulcers. Repeat EGD 8 weeks later after a trial of daily PPI which showed improvement of esophagitis and healed ulcers. VSL probiotics were given at past visit.   Additionally, the patient has been seen for diarrhea which resolved. Viberzi was given but she did not end up needing it. Viberzi was prescribed, but she did not take it. Bowel movements are 5 times a day of soft stool at baseline. Denies blood in stool.  Lower abdominal cramping is intermittent but is resolved after a bowel movement.  HIDA 4/2024 was normal. Celiac panel was negative and CRP was normal.

## 2025-05-13 ENCOUNTER — ERX REFILL RESPONSE (OUTPATIENT)
Dept: URBAN - METROPOLITAN AREA CLINIC 44 | Facility: CLINIC | Age: 30
End: 2025-05-13

## 2025-05-13 RX ORDER — PROMETHAZINE HYDROCHLORIDE 25 MG/1
1 TABLET AS NEEDED FOR NAUSEA TABLET ORAL
Qty: 60 TABLET | Refills: 0

## 2025-05-29 ENCOUNTER — OFFICE VISIT (OUTPATIENT)
Dept: URBAN - METROPOLITAN AREA CLINIC 48 | Facility: CLINIC | Age: 30
End: 2025-05-29

## 2025-06-23 ENCOUNTER — ERX REFILL RESPONSE (OUTPATIENT)
Dept: URBAN - METROPOLITAN AREA CLINIC 44 | Facility: CLINIC | Age: 30
End: 2025-06-23

## 2025-06-23 RX ORDER — PROMETHAZINE HYDROCHLORIDE 25 MG/1
1 TABLET AS NEEDED FOR NAUSEA ORALLY EVERY 12 HRS TABLET ORAL
Qty: 60 TABLET | Refills: 0 | OUTPATIENT

## 2025-06-23 RX ORDER — ONDANSETRON 4 MG/1
DISSOLVE 1 TABLET BY MOUTH ON THE TONGUE ONCE A DAY TABLET, ORALLY DISINTEGRATING ORAL
Qty: 30 TABLET | Refills: 1

## 2025-06-23 RX ORDER — PROMETHAZINE HYDROCHLORIDE 25 MG/1
1 TABLET AS NEEDED FOR NAUSEA TABLET ORAL
Qty: 60 TABLET | Refills: 0 | OUTPATIENT

## 2025-07-16 ENCOUNTER — TELEPHONE ENCOUNTER (OUTPATIENT)
Dept: URBAN - METROPOLITAN AREA CLINIC 48 | Facility: CLINIC | Age: 30
End: 2025-07-16

## 2025-07-21 ENCOUNTER — OFFICE VISIT (OUTPATIENT)
Dept: URBAN - METROPOLITAN AREA TELEHEALTH 2 | Facility: TELEHEALTH | Age: 30
End: 2025-07-21

## 2025-07-21 RX ORDER — METOPROLOL TARTRATE 100 MG/1
1 TABLET WITH FOOD TABLET, FILM COATED ORAL TWICE A DAY
Status: ACTIVE | COMMUNITY

## 2025-07-21 RX ORDER — PANTOPRAZOLE SODIUM 40 MG/1
1 TABLET TABLET, DELAYED RELEASE ORAL ONCE A DAY
Qty: 30 TABLET | Refills: 5 | Status: ACTIVE | COMMUNITY

## 2025-07-21 RX ORDER — PROMETHAZINE HYDROCHLORIDE 25 MG/1
1 TABLET AS NEEDED FOR NAUSEA ORALLY EVERY 12 HRS TABLET ORAL
Qty: 60 TABLET | Refills: 0 | Status: ACTIVE | COMMUNITY

## 2025-07-21 RX ORDER — LORATADINE 10 MG/1
1 TABLET TABLET ORAL ONCE A DAY
Qty: 30 TABLET | Status: ACTIVE | COMMUNITY

## 2025-07-21 RX ORDER — FAMOTIDINE 40 MG/1
1 TABLET AS NEEDED FOR BREAKTHROUGH REFLUX TABLET, FILM COATED ORAL ONCE A DAY
Qty: 30 TABLET | Refills: 3 | Status: DISCONTINUED | COMMUNITY
Start: 2025-03-31

## 2025-07-21 RX ORDER — TOPIRAMATE 50 MG/1
1 TABLET TABLET, FILM COATED ORAL ONCE A DAY
Qty: 30 TABLET | Status: ACTIVE | COMMUNITY

## 2025-07-21 RX ORDER — ONDANSETRON 4 MG/1
DISSOLVE 1 TABLET BY MOUTH ON THE TONGUE ONCE A DAY TABLET, ORALLY DISINTEGRATING ORAL
Qty: 30 TABLET | Refills: 1 | Status: ACTIVE | COMMUNITY

## 2025-07-21 RX ORDER — HYOSCYAMINE SULFATE 0.12 MG/1
1 TABLET UNDER THE TONGUE AND ALLOW TO DISSOLVE AS NEEDED FOR ABDOMINAL PAIN TABLET SUBLINGUAL THREE TIMES A DAY
Qty: 270 TABLET | Refills: 1 | Status: ACTIVE | COMMUNITY
Start: 2025-01-29 | End: 2025-07-28

## 2025-07-21 RX ORDER — ESOMEPRAZOLE MAGNESIUM 40 MG/1
1 CAPSULE 1/2 TO 1 HOUR BEFORE MORNING MEAL CAPSULE, DELAYED RELEASE PELLETS ORAL ONCE A DAY
Qty: 30 | Refills: 6 | Status: DISCONTINUED | COMMUNITY
Start: 2025-03-31

## 2025-07-21 RX ORDER — LANSOPRAZOLE 30 MG/1
1 CAPSULE 1/2 TO 1 HOUR BEFORE MORNING MEAL CAPSULE, DELAYED RELEASE ORAL ONCE A DAY
Qty: 30 | Status: DISCONTINUED | COMMUNITY
Start: 2025-04-16

## 2025-07-28 ENCOUNTER — OFFICE VISIT (OUTPATIENT)
Dept: URBAN - METROPOLITAN AREA TELEHEALTH 2 | Facility: TELEHEALTH | Age: 30
End: 2025-07-28
Payer: COMMERCIAL

## 2025-07-28 DIAGNOSIS — K21.9 GASTROESOPHAGEAL REFLUX DISEASE WITHOUT ESOPHAGITIS: ICD-10-CM

## 2025-07-28 DIAGNOSIS — R19.4 CHANGE IN BOWEL HABITS: ICD-10-CM

## 2025-07-28 DIAGNOSIS — R11.14 BILIOUS VOMITING WITH NAUSEA: ICD-10-CM

## 2025-07-28 PROCEDURE — 99213 OFFICE O/P EST LOW 20 MIN: CPT | Performed by: INTERNAL MEDICINE

## 2025-07-28 RX ORDER — HYOSCYAMINE SULFATE 0.12 MG/1
1 TABLET UNDER THE TONGUE AND ALLOW TO DISSOLVE AS NEEDED FOR ABDOMINAL PAIN TABLET SUBLINGUAL THREE TIMES A DAY
Qty: 270 TABLET | Refills: 1 | Status: ACTIVE | COMMUNITY
Start: 2025-01-29 | End: 2025-07-28

## 2025-07-28 RX ORDER — PANTOPRAZOLE SODIUM 40 MG/1
1 TABLET TABLET, DELAYED RELEASE ORAL ONCE A DAY
Qty: 30 TABLET | Refills: 5 | Status: ACTIVE | COMMUNITY

## 2025-07-28 RX ORDER — LANSOPRAZOLE 30 MG/1
1 CAPSULE 1/2 TO 1 HOUR BEFORE MORNING MEAL CAPSULE, DELAYED RELEASE ORAL ONCE A DAY
Qty: 30 | OUTPATIENT
Start: 2025-07-28

## 2025-07-28 RX ORDER — LORATADINE 10 MG/1
1 TABLET TABLET ORAL ONCE A DAY
Qty: 30 TABLET | Status: ACTIVE | COMMUNITY

## 2025-07-28 RX ORDER — TOPIRAMATE 50 MG/1
1 TABLET TABLET, FILM COATED ORAL ONCE A DAY
Qty: 30 TABLET | Status: ACTIVE | COMMUNITY

## 2025-07-28 RX ORDER — PROMETHAZINE HYDROCHLORIDE 25 MG/1
1 TABLET AS NEEDED FOR NAUSEA ORALLY EVERY 12 HRS TABLET ORAL
Qty: 60 TABLET | Refills: 0 | Status: ACTIVE | COMMUNITY

## 2025-07-28 RX ORDER — PROMETHAZINE HYDROCHLORIDE 25 MG/1
1 TABLET AS NEEDED FOR NAUSEA TABLET ORAL
Qty: 60 TABLET | Refills: 0 | OUTPATIENT
Start: 2025-07-28

## 2025-07-28 RX ORDER — ONDANSETRON 4 MG/1
DISSOLVE 1 TABLET BY MOUTH ON THE TONGUE ONCE A DAY TABLET, ORALLY DISINTEGRATING ORAL
Qty: 30 TABLET | Refills: 1 | Status: ACTIVE | COMMUNITY

## 2025-07-28 RX ORDER — PANTOPRAZOLE SODIUM 40 MG/1
1 TABLET 1/2 TO 1 HOUR BEFORE MORNING MEAL TABLET, DELAYED RELEASE ORAL ONCE A DAY
Qty: 30 | Refills: 5 | Status: ACTIVE | COMMUNITY
Start: 2025-04-09

## 2025-07-28 RX ORDER — METOPROLOL TARTRATE 100 MG/1
1 TABLET WITH FOOD TABLET, FILM COATED ORAL TWICE A DAY
Status: ACTIVE | COMMUNITY

## 2025-07-28 RX ORDER — ONDANSETRON 4 MG/1
1 TABLET ON THE TONGUE AND ALLOW TO DISSOLVE TABLET, ORALLY DISINTEGRATING ORAL ONCE A DAY
Qty: 30 | Refills: 1 | OUTPATIENT
Start: 2025-07-28

## 2025-07-28 NOTE — HPI-TODAY'S VISIT:
31 yo F presents for follow up. Seen previously for nausea, vomiting, and GERD. The patient has a history of GERD that had been well controlled on Pantoprazole 40mg. Esomeprazole was ordered but not covered by insurance. Dyspepsia has resolved. EGD on 7/2022 showed grade D esophagitis and esophageal ulcers. Repeat EGD 8 weeks later after a trial of daily PPI which showed improvement of esophagitis and healed ulcers. VSL probiotics were given at a past visit.   Additionally, the patient has been seen for diarrhea which resolved. Viberzi was given but she did not end up needing it. Bowel movements are 5 times a day of soft stool at baseline. Denies blood in stool.  Lower abdominal cramping is intermittent but is resolved after a bowel movement.  HIDA 4/2024 was normal. Celiac panel was negative and CRP was normal.